# Patient Record
Sex: FEMALE | Race: WHITE | Employment: FULL TIME | URBAN - METROPOLITAN AREA
[De-identification: names, ages, dates, MRNs, and addresses within clinical notes are randomized per-mention and may not be internally consistent; named-entity substitution may affect disease eponyms.]

---

## 2024-02-20 ENCOUNTER — TELEPHONE (OUTPATIENT)
Age: 29
End: 2024-02-20

## 2024-02-20 NOTE — TELEPHONE ENCOUNTER
Patient called to move her botox with Dr. Garcia. Offered pt fist available that was in April. She will keep her original appt.

## 2024-03-26 ENCOUNTER — COSMETIC (OUTPATIENT)
Dept: PLASTIC SURGERY | Facility: CLINIC | Age: 29
End: 2024-03-26

## 2024-03-26 DIAGNOSIS — Z41.1 ELECTIVE PROCEDURE FOR UNACCEPTABLE COSMETIC APPEARANCE: Primary | ICD-10-CM

## 2024-03-26 PROCEDURE — RECHECK: Performed by: STUDENT IN AN ORGANIZED HEALTH CARE EDUCATION/TRAINING PROGRAM

## 2024-03-26 PROCEDURE — BOTOX1U PR BOTOX BY THE UNIT: Performed by: STUDENT IN AN ORGANIZED HEALTH CARE EDUCATION/TRAINING PROGRAM

## 2024-03-26 PROCEDURE — BOTOX2 TWO AREAS OR 50 UNITS: Performed by: STUDENT IN AN ORGANIZED HEALTH CARE EDUCATION/TRAINING PROGRAM

## 2024-03-26 NOTE — PROGRESS NOTES
After informed consent, 20 units of botox was injected into the glabella and forehead.  The patient tolerated the procedure well.     Post-procedure instructions provided.     Patient to return in 3 months or sooner should any issues arise.     Geeta Garcia, DO  Plastic and Reconstructive Surgery

## 2024-06-26 ENCOUNTER — COSMETIC (OUTPATIENT)
Dept: PLASTIC SURGERY | Facility: CLINIC | Age: 29
End: 2024-06-26

## 2024-06-26 DIAGNOSIS — Z41.1 ELECTIVE PROCEDURE FOR UNACCEPTABLE COSMETIC APPEARANCE: Primary | ICD-10-CM

## 2024-06-26 PROCEDURE — RECHECK: Performed by: STUDENT IN AN ORGANIZED HEALTH CARE EDUCATION/TRAINING PROGRAM

## 2024-06-26 PROCEDURE — BOTOX2 TWO AREAS OR 50 UNITS: Performed by: STUDENT IN AN ORGANIZED HEALTH CARE EDUCATION/TRAINING PROGRAM

## 2024-10-22 ENCOUNTER — COSMETIC (OUTPATIENT)
Dept: PLASTIC SURGERY | Facility: CLINIC | Age: 29
End: 2024-10-22

## 2024-10-22 DIAGNOSIS — Z41.1 ELECTIVE PROCEDURE FOR UNACCEPTABLE COSMETIC APPEARANCE: Primary | ICD-10-CM

## 2024-10-22 PROCEDURE — BOTOX1 ONE AREA OR 25 UNITS: Performed by: STUDENT IN AN ORGANIZED HEALTH CARE EDUCATION/TRAINING PROGRAM

## 2024-10-22 PROCEDURE — BOTOX3 THREE OR MORE AREAS OR GREATER THAN 75 UNITS: Performed by: STUDENT IN AN ORGANIZED HEALTH CARE EDUCATION/TRAINING PROGRAM

## 2024-10-22 PROCEDURE — RECHECK: Performed by: STUDENT IN AN ORGANIZED HEALTH CARE EDUCATION/TRAINING PROGRAM

## 2024-10-22 NOTE — PROGRESS NOTES
After informed consent, 20 units of botox was injected into the nasalis, glabella and forehead.  The patient tolerated the procedure well.     Post-procedure instructions provided.     Patient to return in 3 months or sooner should any issues arise.     Geeta Garcia, DO  Plastic and Reconstructive Surgery